# Patient Record
Sex: FEMALE | Race: BLACK OR AFRICAN AMERICAN
[De-identification: names, ages, dates, MRNs, and addresses within clinical notes are randomized per-mention and may not be internally consistent; named-entity substitution may affect disease eponyms.]

---

## 2017-12-02 ENCOUNTER — HOSPITAL ENCOUNTER (EMERGENCY)
Dept: HOSPITAL 61 - ER | Age: 57
Discharge: HOME | End: 2017-12-02
Payer: COMMERCIAL

## 2017-12-02 VITALS — BODY MASS INDEX: 27.31 KG/M2 | HEIGHT: 64 IN | WEIGHT: 160 LBS

## 2017-12-02 VITALS — SYSTOLIC BLOOD PRESSURE: 139 MMHG | DIASTOLIC BLOOD PRESSURE: 91 MMHG

## 2017-12-02 DIAGNOSIS — G89.29: Primary | ICD-10-CM

## 2017-12-02 DIAGNOSIS — M25.561: ICD-10-CM

## 2017-12-02 PROCEDURE — 99282 EMERGENCY DEPT VISIT SF MDM: CPT

## 2017-12-02 NOTE — PHYS DOC
Past Medical History


Past Medical History:  No Pertinent History


Past Surgical History:  No Surgical History


Alcohol Use:  Heavy


Drug Use:  None





Adult General


Chief Complaint


Chief Complaint:  KNEE SWELLING





HPI


HPI





Patient is a 57  year old female who presents with HEENT to bilateral knees. 

Patient states that she has had long-term chronic knee pain. She does work on 

her feet as a  at school. She states that she took off work due to 

knee pain and needs a work note to return. She also had taken some naproxen 

sodium that helped with her pain and is requesting a prescription for that 

medication.





Review of Systems


Review of Systems





Constitutional: Denies fever or chills []


Respiratory: Denies cough or shortness of breath []


Cardiovascular: No additional information not addressed in HPI []


Musculoskeletal: See HPI


Integument: Denies rash or skin lesions []


Neurologic: Denies headache, focal weakness or sensory changes []


Endocrine: Denies polyuria or polydipsia []





All other systems were reviewed and found to be within normal limits, except as 

documented in this note.





Allergies


Allergies





Allergies








Coded Allergies Type Severity Reaction Last Updated Verified


 


  No Known Drug Allergies    12/2/17 No











Physical Exam


Physical Exam





Constitutional: Well developed, well nourished, no acute distress, non-toxic 

appearance. []


Cardiovascular:Heart rate regular rhythm, no murmur []


Lungs & Thorax:  Bilateral breath sounds clear to auscultation []


Skin: Warm, dry, no erythema, no rash. [] 


Back: No tenderness, no CVA tenderness. [] 


Extremities: mild tenderness, no cyanosis, no clubbing, ROM intact, mild edema 

to the patient's right knee, crepitus noted. [] 


Neurologic: Alert and oriented X 3, normal motor function, normal sensory 

function, no focal deficits noted. []


Psychologic: Affect normal, judgement normal, mood normal. []





Current Patient Data


Vital Signs





 Vital Signs








  Date Time  Temp Pulse Resp B/P (MAP) Pulse Ox O2 Delivery O2 Flow Rate FiO2


 


12/2/17 13:30 98.2 93 16  96 Room Air  





 98.2       











EKG


EKG


[]





Radiology/Procedures


Radiology/Procedures


[]





Course & Med Decision Making


Course & Med Decision Making


Pertinent Labs and Imaging studies reviewed. (See chart for details)





[]1. Knee pain


2. Need for work note





The patient was given a return to work note. She was also provided with a 

prescription for Naprosyn sodium. She is to return to the ED if worsening or 

follow-up with her primary care provider for evaluation of this chronic joint 

pain.





Dragon Disclaimer


Dragon Disclaimer


This electronic medical record was generated, in whole or in part, using a 

voice recognition dictation system.





Departure


Departure


Impression:  


 Primary Impression:  


 Knee pain


Disposition:  01 HOME, SELF-CARE


Condition:  STABLE


Referrals:  


NO PCP (PCP)


Patient Instructions:  Knee Pain, Easy-to-Read





Additional Instructions:  


Follow-up with your primary care provider in one week if not improving. Return 

to the ED if worsening. Make sure you have feeling her stomach when taking the 

pain medication.


Scripts


Naproxen Sodium (NAPROXEN SODIUM) 550 Mg Tablet


1 TAB PO BID Y for PAIN, #30 TAB


   Prov: MENG CARLOS         12/2/17











MENG CARLOS Dec 2, 2017 16:18

## 2018-03-03 ENCOUNTER — HOSPITAL ENCOUNTER (EMERGENCY)
Dept: HOSPITAL 61 - ER | Age: 58
Discharge: HOME | End: 2018-03-03
Payer: COMMERCIAL

## 2018-03-03 DIAGNOSIS — M17.12: Primary | ICD-10-CM

## 2018-03-03 PROCEDURE — 99283 EMERGENCY DEPT VISIT LOW MDM: CPT

## 2021-02-12 ENCOUNTER — HOSPITAL ENCOUNTER (EMERGENCY)
Dept: HOSPITAL 61 - ER | Age: 61
Discharge: HOME | End: 2021-02-12
Payer: COMMERCIAL

## 2021-02-12 VITALS — WEIGHT: 145.28 LBS | HEIGHT: 65 IN | BODY MASS INDEX: 24.21 KG/M2

## 2021-02-12 VITALS — SYSTOLIC BLOOD PRESSURE: 156 MMHG | DIASTOLIC BLOOD PRESSURE: 104 MMHG

## 2021-02-12 DIAGNOSIS — F10.20: ICD-10-CM

## 2021-02-12 DIAGNOSIS — M17.11: Primary | ICD-10-CM

## 2021-02-12 DIAGNOSIS — M25.562: ICD-10-CM

## 2021-02-12 DIAGNOSIS — F17.200: ICD-10-CM
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
## 2021-02-12 PROCEDURE — 73564 X-RAY EXAM KNEE 4 OR MORE: CPT

## 2021-02-12 PROCEDURE — 99283 EMERGENCY DEPT VISIT LOW MDM: CPT

## 2021-02-12 NOTE — PHYS DOC
Past Medical History


Past Medical History:  Arthritis


Past Surgical History:  No Surgical History


Smoking Status:  Current Every Day Smoker


Alcohol Use:  Heavy


Drug Use:  None





General Adult


EDM:


Chief Complaint:  LOWER EXTREMITY SWELLING





HPI:


HPI:





Patient is a 60  year old female who presents with right knee swelling and 

stiffness for the last 2 days.  Patient states it hurts to walk.  She states 

that she just sitting there does not hurt until she has to try to walk on it.  

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  Patient has arthritis to the left knee and arthritis

in her hands bilaterally with the formation at the joints and swelling.  Patient

states she does not have an orthopedic doctor and she knows she has arthritis.  

Patient denies tenderness to the extremity, fever, skin color change, coolness 

to the extremity, focal weakness, injury.





Review of Systems:


Review of Systems:


Constitutional:   Denies fever or chills. []


Eyes:   Denies change in visual acuity. []


HENT:   Denies nasal congestion or sore throat. [] 


Respiratory:   Denies cough or shortness of breath. [] 


Cardiovascular:   Denies chest pain. + Right knee swelling edema. [] 


GI:   Denies abdominal pain, nausea, vomiting, bloody stools or diarrhea. [] 


:  Denies dysuria. [] 


Musculoskeletal:   Denies back pain. + Right knee joint pain and stiffness. [] 


Integument:   Denies rash. [] 


Neurologic:   Denies headache, focal weakness or sensory changes. [] 


Endocrine:   Denies polyuria or polydipsia. [] 


Lymphatic:  Denies swollen glands. [] 


Psychiatric:  Denies depression or anxiety. []





Heart Score:


Risk Factors:


Risk Factors:  DM, Current or recent (<one month) smoker, HTN, HLP, family 

history of CAD, obesity.


Risk Scores:


Score 0 - 3:  2.5% MACE over next 6 weeks - Discharge Home


Score 4 - 6:  20.3% MACE over next 6 weeks - Admit for Clinical Observation


Score 7 - 10:  72.7% MACE over next 6 weeks - Early Invasive Strategies





Allergies:


Allergies:





Allergies








Coded Allergies Type Severity Reaction Last Updated Verified


 


  No Known Drug Allergies    17 No











Physical Exam:


PE:





Constitutional: Well developed, well nourished, no acute distress, non-toxic 

appearance. []


HENT: Normocephalic, atraumatic, bilateral external ears normal, oropharynx 

moist, no oral exudates, nose normal. []


Eyes: PERRLA, EOMI, conjunctiva normal, no discharge. [] 


Neck: Normal range of motion, no tenderness, supple, no stridor. [] 


Cardiovascular:Heart rate regular rhythm, no murmur []


Lungs & Thorax:  Bilateral breath sounds clear to auscultation []


Abdomen: Bowel sounds normal, soft, no tenderness, no masses, no pulsatile 

masses. [] 


Skin: Warm, dry, no erythema, no rash. [] 


Back: No tenderness, no CVA tenderness. [] 


Extremities: No tenderness, no cyanosis, no clubbing, right knee ROM limited but

 intact, right knee 1+ edema. [] 


Neurologic: Alert and oriented X 3, normal motor function, normal sensory 

function, no focal deficits noted. []


Psychologic: Affect normal, judgement normal, mood normal. []





Current Patient Data:


Vital Signs:





                                   Vital Signs








  Date Time  Temp Pulse Resp B/P (MAP) Pulse Ox O2 Delivery O2 Flow Rate FiO2


 


21 18:00 97.4 94 20 156/105 (122) 97 Room Air  





 97.4       











EKG:


EKG:


[]





Radiology/Procedures:


Radiology/Procedures:


[]


Impression:


                            [facility]


                    [address] [place] [address]


                                 [phone]


                                        


                                 IMAGING REPORT





                                     Signed





PATIENT: CHRIS TAYLOR     ACCOUNT: [id number]     MRN#: [id number]


: 1960           LOCATION: ER              AGE: 60


SEX: F                    EXAM DT: 21         ACCESSION#: [id number]


STATUS: REG ER            ORD. PHYSICIAN: SADE SELLERS


REASON: pain, swelling, NO KNOW INJURY..


PROCEDURE: KNEE RIGHT 4V





4 view right knee





HISTORY: Pain and swelling





AP lateral oblique and sunrise views right knee





There is marked marginal spurring of all 3 compartments. There is loss of joint 

space in the medial compartment. There is no lytic destructive changes seen. 

There is no effusion seen on the lateral view.





IMPRESSION:





1. Severe osteoarthrosis.


2. Moderate joint effusion.





Electronically signed by: Isa Golden III, MD (2021 7:11 PM) [facility]














DICTATED and SIGNED BY:     ISA GOLDEN III, MD


DATE:     21 [id number]





Course & Med Decision Making:


Course & Med Decision Making


Pertinent Labs and Imaging studies reviewed. (See chart for details)





See HPI.  Alert and oriented x4.  Patient can fully bend the knee but cannot 

fully extend the knee due to swelling.  Skin pink warm and dry.  Popliteal pulse

 strong and present.  There is no swelling to the rest of the joint.  No calf 

tenderness.  Denies any numbness or tingling.  No focal weakness.  No tenderness

 to the joint and no redness.  Afebrile.  Speaks in full complete sentences.  

Ambulatory with a steady gait but limping on the right knee.





[]





Dragon Disclaimer:


Dragon Disclaimer:


This electronic medical record was generated, in whole or in part, using a voice

 recognition dictation system.





Departure


Departure


Impression:  


   Primary Impression:  


   Arthritis


   Additional Impression:  


   Knee pain, left


   Qualified Codes:  M25.562 - Pain in left knee


Disposition:  01 DC HOME SELF CARE/HOMELESS


Condition:  STABLE


Referrals:  


SAVANA PRICE MD (PCP)








ISA HOLCOMB MD


Patient Instructions:  Osteoarthritis





Additional Instructions:  


Follow-up with an orthopedic doctor soon as possible.  Take medication as 

prescribed and with food.


Scripts


Naproxen (NAPROXEN) 500 Mg Tablet


1 TAB PO BID for pain for 30 Days, #60 TAB 0 Refills


   Prov: SADE SELLERS APRN         21 


Methylprednisolone (MEDROL) 4 Mg Tab.ds.pk


1 PKG PO UD, #1 PKG


   Prov: SADE SELLERS APRN         21 


Hydrocodone Bit/Acetaminophen (HYDROCODONE-APAP 5-325  **) 1 Tab Tablet


1 TAB PO PRN Q6HRS PRN for PAIN, #10 TAB 0 Refills


   Prov: SADE SELLERS APRN         21











SADE SELLERS APRN            2021 18:30

## 2021-02-12 NOTE — RAD
4 view right knee



HISTORY: Pain and swelling



AP lateral oblique and sunrise views right knee



There is marked marginal spurring of all 3 compartments. There is loss of joint space in the medial c
ompartment. There is no lytic destructive changes seen. There is no effusion seen on the lateral view
.



IMPRESSION:



1. Severe osteoarthrosis.

2. Moderate joint effusion.



Electronically signed by: Richard Gordon III, MD (2/12/2021 7:11 PM) [facility]